# Patient Record
Sex: MALE | Race: WHITE
[De-identification: names, ages, dates, MRNs, and addresses within clinical notes are randomized per-mention and may not be internally consistent; named-entity substitution may affect disease eponyms.]

---

## 2023-04-11 ENCOUNTER — HOSPITAL ENCOUNTER (INPATIENT)
Dept: HOSPITAL 54 - ER | Age: 88
LOS: 5 days | Discharge: HOME | DRG: 280 | End: 2023-04-16
Attending: INTERNAL MEDICINE | Admitting: INTERNAL MEDICINE
Payer: MEDICARE

## 2023-04-11 VITALS — WEIGHT: 162 LBS | BODY MASS INDEX: 24.55 KG/M2 | HEIGHT: 68 IN

## 2023-04-11 VITALS — DIASTOLIC BLOOD PRESSURE: 93 MMHG | SYSTOLIC BLOOD PRESSURE: 145 MMHG

## 2023-04-11 VITALS — DIASTOLIC BLOOD PRESSURE: 83 MMHG | SYSTOLIC BLOOD PRESSURE: 142 MMHG

## 2023-04-11 VITALS — SYSTOLIC BLOOD PRESSURE: 145 MMHG | DIASTOLIC BLOOD PRESSURE: 93 MMHG

## 2023-04-11 VITALS — SYSTOLIC BLOOD PRESSURE: 200 MMHG | DIASTOLIC BLOOD PRESSURE: 120 MMHG

## 2023-04-11 DIAGNOSIS — D18.09: ICD-10-CM

## 2023-04-11 DIAGNOSIS — E83.39: ICD-10-CM

## 2023-04-11 DIAGNOSIS — I16.0: Primary | ICD-10-CM

## 2023-04-11 DIAGNOSIS — Z79.02: ICD-10-CM

## 2023-04-11 DIAGNOSIS — N17.0: ICD-10-CM

## 2023-04-11 DIAGNOSIS — I10: ICD-10-CM

## 2023-04-11 DIAGNOSIS — N28.1: ICD-10-CM

## 2023-04-11 DIAGNOSIS — K21.9: ICD-10-CM

## 2023-04-11 DIAGNOSIS — E78.5: ICD-10-CM
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
## 2023-04-11 DIAGNOSIS — Z90.79: ICD-10-CM

## 2023-04-11 DIAGNOSIS — E87.6: ICD-10-CM

## 2023-04-11 DIAGNOSIS — Z79.82: ICD-10-CM

## 2023-04-11 DIAGNOSIS — Z20.822: ICD-10-CM

## 2023-04-11 DIAGNOSIS — I21.A1: ICD-10-CM

## 2023-04-11 DIAGNOSIS — F33.2: ICD-10-CM

## 2023-04-11 DIAGNOSIS — T39.1X2A: ICD-10-CM

## 2023-04-11 DIAGNOSIS — I69.398: ICD-10-CM

## 2023-04-11 DIAGNOSIS — K71.2: ICD-10-CM

## 2023-04-11 DIAGNOSIS — Z79.899: ICD-10-CM

## 2023-04-11 DIAGNOSIS — R41.9: ICD-10-CM

## 2023-04-11 DIAGNOSIS — D69.6: ICD-10-CM

## 2023-04-11 DIAGNOSIS — D68.9: ICD-10-CM

## 2023-04-11 DIAGNOSIS — F05: ICD-10-CM

## 2023-04-11 LAB
ALBUMIN SERPL BCP-MCNC: 4.4 G/DL (ref 3.4–5)
ALP SERPL-CCNC: 59 U/L (ref 46–116)
ALT SERPL W P-5'-P-CCNC: 682 U/L (ref 12–78)
AST SERPL W P-5'-P-CCNC: 415 U/L (ref 15–37)
BASOPHILS # BLD AUTO: 0.1 K/UL (ref 0–0.2)
BASOPHILS NFR BLD AUTO: 0.6 % (ref 0–2)
BILIRUB DIRECT SERPL-MCNC: 0.4 MG/DL (ref 0–0.2)
BILIRUB SERPL-MCNC: 0.8 MG/DL (ref 0.2–1)
BILIRUB UR QL STRIP: NEGATIVE
BUN SERPL-MCNC: 38 MG/DL (ref 7–18)
CALCIUM SERPL-MCNC: 9.3 MG/DL (ref 8.5–10.1)
CHLORIDE SERPL-SCNC: 100 MMOL/L (ref 98–107)
CO2 SERPL-SCNC: 24 MMOL/L (ref 21–32)
COLOR UR: YELLOW
CREAT SERPL-MCNC: 2.1 MG/DL (ref 0.6–1.3)
EOSINOPHIL NFR BLD AUTO: 0 % (ref 0–6)
GLUCOSE SERPL-MCNC: 113 MG/DL (ref 74–106)
GLUCOSE UR STRIP-MCNC: NEGATIVE MG/DL
HCT VFR BLD AUTO: 50 % (ref 39–51)
HGB BLD-MCNC: 16.8 G/DL (ref 13.5–17.5)
LEUKOCYTE ESTERASE UR QL STRIP: NEGATIVE
LYMPHOCYTES NFR BLD AUTO: 0.8 K/UL (ref 0.8–4.8)
LYMPHOCYTES NFR BLD AUTO: 7.9 % (ref 20–44)
MCHC RBC AUTO-ENTMCNC: 34 G/DL (ref 31–36)
MCV RBC AUTO: 97 FL (ref 80–96)
MONOCYTES NFR BLD AUTO: 0.7 K/UL (ref 0.1–1.3)
MONOCYTES NFR BLD AUTO: 7.2 % (ref 2–12)
NEUTROPHILS # BLD AUTO: 8.2 K/UL (ref 1.8–8.9)
NEUTROPHILS NFR BLD AUTO: 84.3 % (ref 43–81)
NITRITE UR QL STRIP: NEGATIVE
PH UR STRIP: 5 [PH] (ref 5–8)
PLATELET # BLD AUTO: 107 K/UL (ref 150–450)
POTASSIUM SERPL-SCNC: 4.2 MMOL/L (ref 3.5–5.1)
PROT SERPL-MCNC: 8.1 G/DL (ref 6.4–8.2)
PROT UR QL STRIP: 100 MG/DL
RBC # BLD AUTO: 5.19 MIL/UL (ref 4.5–6)
RBC #/AREA URNS HPF: (no result) /HPF (ref 0–2)
SODIUM SERPL-SCNC: 138 MMOL/L (ref 136–145)
UROBILINOGEN UR STRIP-MCNC: 0.2 EU/DL
WBC #/AREA URNS HPF: (no result) /HPF (ref 0–3)
WBC NRBC COR # BLD AUTO: 9.8 K/UL (ref 4.3–11)

## 2023-04-11 PROCEDURE — G0378 HOSPITAL OBSERVATION PER HR: HCPCS

## 2023-04-11 PROCEDURE — A4223 INFUSION SUPPLIES W/O PUMP: HCPCS

## 2023-04-11 PROCEDURE — C9803 HOPD COVID-19 SPEC COLLECT: HCPCS

## 2023-04-11 RX ADMIN — SODIUM CHLORIDE PRN MLS/HR: 4.5 INJECTION, SOLUTION INTRAVENOUS at 17:03

## 2023-04-11 RX ADMIN — HYDRALAZINE HYDROCHLORIDE PRN MG: 20 INJECTION INTRAMUSCULAR; INTRAVENOUS at 15:29

## 2023-04-11 NOTE — NUR
DARWIN 89 FROM HOME FOR GENERALIZED WEAKNE FOR THE PAST COUPLE MONTHS WITH 
INCREASED WEAKNESS THE PAST COUPLE DAYS. PT HAS A STROKE A COUPLE MONTHS AGO. 
PT ATTACHED TO MONITOR, BLOOD PRESSURE ELEVATED, MD AWARE. AWAITING MD ORDERS.

## 2023-04-11 NOTE — NUR
TELE RN OPENING NOTES - RECEIVED PATIENT AWAKE IN BED. A/O X2, FARSI SPEAKING. BREATHING 
EVEN AND NON-LABORED ON ROOM AIR. NOT IN APPARENT DISTRESS. DENIES PAIN AT THIS TIME. ON 
TELE MONITOR READING SINUS TACHYCARDIA  BPM. HAS LEFT ANTECUBITAL IV ACCESS #18G WITH 
0.45% NS RUNNING AT 75 ML/HR. NO S/S OF INFILTRATION NOTED. SAFETY PRECAUTIONS IN PLACE: BED 
LOCKED AND IN LOW POSITION, SIDE RAILS UP X2, CALL LIGHT WITHIN REACH. WILL CONTINUE PLAN OF 
CARE.

## 2023-04-11 NOTE — NUR
RN TELE NOTES

REPORTED LATEST TROPONIN LEVEL  TO DR. LIN, NO NEW ORDER GIVEN, ALSO RECEIVED 
ORDER FOR CODE STATUS DNR/DNI, NOTED AND CARRIED OUT.

## 2023-04-11 NOTE — NUR
RECEIVED CALL FROM LAB, CRITICAL VALUE TROPONIN 136, TRENDING DOWN. HOSPITALIST ALINE HEBERT.

-------------------------------------------------------------------------------

Addendum: 04/11/23 at 2216 by MARIO MAY DINESH DUMONT

-------------------------------------------------------------------------------

JOSE

## 2023-04-11 NOTE — NUR
RN TELE ADMISSION NOTES



ADMITTED PT AROUND [id number], FARSI SPEAKING. WIFE AT BEDSIDE TO TRANSLATE. PRE ASSESSMENT 
COMPLETED, SKIN INTACT. IV ACCESS ON LAC 22 G SL. ANXIOUS AT TIMES. BLOOD PRESSURE HIGH, MD 
INFORMED. PT APPEARS FLUSHED. OXYGEN AT 98%. WILL CONTINUE TO MONITOR. ON PUREED DIET. 
SAFETY MEASURES IN PLACED. BED LOCKED IN LOWEST POSITION. SIDE RAILS UP X2. CALL LIGHT 
WITHIN REACH.

## 2023-04-11 NOTE — NUR
RN TELE NOTES

/120 HR 97, LATEST TROPONIN 99, DR. LIN INFORMED, ORDERED FOR HYDRALAZINE IV, 
NOTED AND CARRIED OUT, PT IS SINUS TACH ON TELE MONITOR.

## 2023-04-11 NOTE — NUR
RN TELE CLOSING NOTES



PT RESTING IN BED, AO X2-3, FARSI SPEAKING, CONFUSED AT TIMES. DNR/DNI STATUS. ADMITTED D/T 
NSTEMI AND AMS. IV ACCESS ON LAC #22G RUNNING 0.45% NS RUNNING AT 75 ML/HR. ON PUREED FIET, 
MEDS CRUSHED. CONTINENT USING URINAL. ON TELE MONITOR AT SR 99. NO COMPLAINTS AT THIS TIME. 
NO SIGNS/SYMPTOMS OF RESPIRATORY DISTRESS. SKIN INTACT. SAFETY PRECAUTIONS IN PLACED. SIDE 
RAILS UP X2, BED LOCKED IN LOWEST POSITION, CALL LIGHT WITHIN REACH. ALL MEDS ADMINISTERED 
AS SCHEDULED. WILL ENDORSE TO NEXT SHIFT.

## 2023-04-12 VITALS — SYSTOLIC BLOOD PRESSURE: 147 MMHG | DIASTOLIC BLOOD PRESSURE: 80 MMHG

## 2023-04-12 VITALS — DIASTOLIC BLOOD PRESSURE: 70 MMHG | SYSTOLIC BLOOD PRESSURE: 140 MMHG

## 2023-04-12 VITALS — DIASTOLIC BLOOD PRESSURE: 83 MMHG | SYSTOLIC BLOOD PRESSURE: 140 MMHG

## 2023-04-12 VITALS — SYSTOLIC BLOOD PRESSURE: 133 MMHG | DIASTOLIC BLOOD PRESSURE: 86 MMHG

## 2023-04-12 VITALS — SYSTOLIC BLOOD PRESSURE: 158 MMHG | DIASTOLIC BLOOD PRESSURE: 86 MMHG

## 2023-04-12 VITALS — DIASTOLIC BLOOD PRESSURE: 86 MMHG | SYSTOLIC BLOOD PRESSURE: 148 MMHG

## 2023-04-12 LAB
ALBUMIN SERPL BCP-MCNC: 3.7 G/DL (ref 3.4–5)
ALP SERPL-CCNC: 48 U/L (ref 46–116)
ALT SERPL W P-5'-P-CCNC: 4013 U/L (ref 12–78)
AMMONIA PLAS-SCNC: 37 UMOL/L (ref 11–32)
APAP SERPL-MCNC: 91 UG/ML (ref 10–30)
AST SERPL W P-5'-P-CCNC: 2873 U/L (ref 15–37)
BASOPHILS # BLD AUTO: 0 K/UL (ref 0–0.2)
BASOPHILS NFR BLD AUTO: 0 % (ref 0–2)
BILIRUB DIRECT SERPL-MCNC: 0.7 MG/DL (ref 0–0.2)
BILIRUB SERPL-MCNC: 1.1 MG/DL (ref 0.2–1)
BUN SERPL-MCNC: 46 MG/DL (ref 7–18)
CALCIUM SERPL-MCNC: 8.4 MG/DL (ref 8.5–10.1)
CHLORIDE SERPL-SCNC: 103 MMOL/L (ref 98–107)
CHOLEST SERPL-MCNC: 151 MG/DL (ref ?–200)
CO2 SERPL-SCNC: 16 MMOL/L (ref 21–32)
CREAT SERPL-MCNC: 2.1 MG/DL (ref 0.6–1.3)
EOSINOPHIL NFR BLD AUTO: 0 % (ref 0–6)
GLUCOSE SERPL-MCNC: 92 MG/DL (ref 74–106)
HCT VFR BLD AUTO: 48 % (ref 39–51)
HDLC SERPL-MCNC: 81 MG/DL (ref 40–60)
HGB BLD-MCNC: 16 G/DL (ref 13.5–17.5)
LDLC SERPL DIRECT ASSAY-MCNC: 70 MG/DL (ref 0–99)
LYMPHOCYTES NFR BLD AUTO: 0.6 K/UL (ref 0.8–4.8)
LYMPHOCYTES NFR BLD AUTO: 4.8 % (ref 20–44)
MAGNESIUM SERPL-MCNC: 2.4 MG/DL (ref 1.8–2.4)
MCHC RBC AUTO-ENTMCNC: 34 G/DL (ref 31–36)
MCV RBC AUTO: 97 FL (ref 80–96)
MONOCYTES NFR BLD AUTO: 0.6 K/UL (ref 0.1–1.3)
MONOCYTES NFR BLD AUTO: 4.5 % (ref 2–12)
NEUTROPHILS # BLD AUTO: 11.2 K/UL (ref 1.8–8.9)
NEUTROPHILS NFR BLD AUTO: 90.7 % (ref 43–81)
PHOSPHATE SERPL-MCNC: 5.8 MG/DL (ref 2.5–4.9)
PLATELET # BLD AUTO: 103 K/UL (ref 150–450)
POTASSIUM SERPL-SCNC: 4.2 MMOL/L (ref 3.5–5.1)
PROT SERPL-MCNC: 6.5 G/DL (ref 6.4–8.2)
RBC # BLD AUTO: 4.9 MIL/UL (ref 4.5–6)
SODIUM SERPL-SCNC: 137 MMOL/L (ref 136–145)
TRIGL SERPL-MCNC: 35 MG/DL (ref 30–150)
TSH SERPL DL<=0.005 MIU/L-ACNC: 0.36 UIU/ML (ref 0.36–3.74)
WBC NRBC COR # BLD AUTO: 12.4 K/UL (ref 4.3–11)

## 2023-04-12 RX ADMIN — Medication SCH TAB: at 08:25

## 2023-04-12 RX ADMIN — PANTOPRAZOLE SODIUM SCH MG: 40 TABLET, DELAYED RELEASE ORAL at 07:47

## 2023-04-12 RX ADMIN — SODIUM CHLORIDE PRN MLS/HR: 4.5 INJECTION, SOLUTION INTRAVENOUS at 08:02

## 2023-04-12 RX ADMIN — SODIUM CHLORIDE PRN MLS/HR: 4.5 INJECTION, SOLUTION INTRAVENOUS at 21:46

## 2023-04-12 RX ADMIN — Medication SCH TAB: at 17:07

## 2023-04-12 RX ADMIN — CLOPIDOGREL BISULFATE SCH MG: 75 TABLET, FILM COATED ORAL at 08:25

## 2023-04-12 RX ADMIN — OXYCODONE HYDROCHLORIDE AND ACETAMINOPHEN SCH MG: 500 TABLET ORAL at 08:25

## 2023-04-12 NOTE — NUR
RN NOTES - WIFE IS AT BEDSIDE ASKING FOR SANDWICH AND WANTS THE PATIENT TO WALK. EXPLAINED 
THAT THE PATIENT IS AN ASPIRATION AND FALL RISK FOR NOW. PATIENT IS COMPLIANT.

## 2023-04-12 NOTE — NUR
TELE RN OPENING NOTES 



RECEIVED PATIENT AWAKE IN BED WITH HOB ELEVATED, A/OX2, APPEARS CONFUSED, FARSI SPEAKING, 
CLEAR SPEECH, ABLE TO MAKE NEEDS KNOWN. ON ROOM AIR BREATHING EVEN AND NOT IN ANY APPARENT 
ACUTE DISTRESS. DENIES PAIN OR DISCOMFORT AT THIS TIME. TELEMONITOR IS SHOWING SINUS RHYTHM 
AT 99 BPM/ 107 BPM. IV ACCESS ON LEFT ANTECUBITAL #18G WITH 1/2 NS RUNNING AT 75 ML/HR, 
PATENT AND FLUSHING WELL. SAFETY PRECAUTIONS IN PLACE: BED LOCKED AND IN LOWEST POSITION, 
SIDE RAILS UP X2, BED ALARM ON, CALL LIGHT AND TRAY WITHIN REACH. WILL CONTINUE PLAN OF 
CARE.

## 2023-04-12 NOTE — NUR
TELE RN CLOSING NOTES - PATIENT RESTING, ABLE TO RELAY NEEDS. NO SOB OR  NOTED, 
SATURATING WELL ON ROOM AIR. NO RESPIRATORY OR CARDIAC DISTRESS NOTED. AFEBRILE. ON TELE 
MONITOR READING SINUS TACHYCARDIA  BPM. LEFT ANTECUBITAL IV ACCESS INTACT, PATENT AND 
FLUSHING. ASPIRATION PRECAUTIONS OBSERVED. ABLE TO USE URINAL BOTTLE, CLEAR YELLOW URINE 
NOTED. ALL DUE MEDS GIVEN AND NEEDS ATTENDED. SAFETY PRECAUTIONS MAINTAINED. WILL ENDORSE TO 
NEXT SHIFT FOR CHRISTINE.

## 2023-04-12 NOTE — NUR
TELE RN CLOSING NOTES 



PATIENT AWAKE IN BED WITH HOB ELEVATED, A/OX2, EPISODES OF CONFUSION, COOPERATIVE. STILL ON 
ROOM AIR BREATHING EVEN, NO SOB. NO PAIN OR DISCOMFORT AT THIS TIME. TELEMONITOR IS SHOWING 
SINUS RHYTHM AT 93 BPM WITH BORDERLINE 1ST DEGREE BLOCK. WITH IV ACCESS ON LEFT ANTECUBITAL 
#18G WITH 1/2 NS RUNNING AT 75 ML/HR, PATENT AND FLUSHING WELL. ALL DUE MEDS GIVEN, ALL 
NEEDS MET. SAFETY PRECAUTIONS MAINTAINED: BED LOCKED AND IN LOWEST POSITION, SIDE RAILS UP 
X2, BED ALARM ON, CALL LIGHT AND TRAY WITHIN REACH. ENDORSED TO NIGHT SHIFT NURSE.

## 2023-04-12 NOTE — NUR
RN NOTES - PT COMPLAINING OF HEADACHE 7/10 AND SHORTNESS OF BREATH, WHEN CHECKED VITAL SIGNS 
ARE STABLE. SPO2 - 97% ON ROOM AIR. GIVEN NORCO 10/325 VIA PO. MD GAVE AN ORDER FOR OXYGEN 
INHALATION FOR COMFORT, WILL GIVE 1LPM VIA NC AND CHEST XRAY ORDERED AS WELL.

## 2023-04-13 VITALS — DIASTOLIC BLOOD PRESSURE: 83 MMHG | SYSTOLIC BLOOD PRESSURE: 165 MMHG

## 2023-04-13 VITALS — SYSTOLIC BLOOD PRESSURE: 183 MMHG | DIASTOLIC BLOOD PRESSURE: 82 MMHG

## 2023-04-13 VITALS — DIASTOLIC BLOOD PRESSURE: 69 MMHG | SYSTOLIC BLOOD PRESSURE: 147 MMHG

## 2023-04-13 VITALS — SYSTOLIC BLOOD PRESSURE: 150 MMHG | DIASTOLIC BLOOD PRESSURE: 87 MMHG

## 2023-04-13 VITALS — SYSTOLIC BLOOD PRESSURE: 152 MMHG | DIASTOLIC BLOOD PRESSURE: 83 MMHG

## 2023-04-13 VITALS — DIASTOLIC BLOOD PRESSURE: 95 MMHG | SYSTOLIC BLOOD PRESSURE: 148 MMHG

## 2023-04-13 VITALS — DIASTOLIC BLOOD PRESSURE: 63 MMHG | SYSTOLIC BLOOD PRESSURE: 159 MMHG

## 2023-04-13 LAB
ALBUMIN SERPL BCP-MCNC: 3.3 G/DL (ref 3.4–5)
ALP SERPL-CCNC: 55 U/L (ref 46–116)
ALT SERPL W P-5'-P-CCNC: (no result) U/L (ref 12–78)
AST SERPL W P-5'-P-CCNC: 6374 U/L (ref 15–37)
BASOPHILS # BLD AUTO: 0 K/UL (ref 0–0.2)
BASOPHILS NFR BLD AUTO: 0.1 % (ref 0–2)
BILIRUB DIRECT SERPL-MCNC: 0.6 MG/DL (ref 0–0.2)
BILIRUB SERPL-MCNC: 1.2 MG/DL (ref 0.2–1)
BUN SERPL-MCNC: 48 MG/DL (ref 7–18)
CALCIUM SERPL-MCNC: 8.1 MG/DL (ref 8.5–10.1)
CHLORIDE SERPL-SCNC: 103 MMOL/L (ref 98–107)
CO2 SERPL-SCNC: 20 MMOL/L (ref 21–32)
CREAT SERPL-MCNC: 1.8 MG/DL (ref 0.6–1.3)
EOSINOPHIL NFR BLD AUTO: 0 % (ref 0–6)
GLUCOSE SERPL-MCNC: 86 MG/DL (ref 74–106)
HCT VFR BLD AUTO: 47 % (ref 39–51)
HGB BLD-MCNC: 15.3 G/DL (ref 13.5–17.5)
LYMPHOCYTES NFR BLD AUTO: 0.6 K/UL (ref 0.8–4.8)
LYMPHOCYTES NFR BLD AUTO: 6.5 % (ref 20–44)
LYMPHOCYTES NFR BLD MANUAL: 4 % (ref 16–48)
MAGNESIUM SERPL-MCNC: 2.4 MG/DL (ref 1.8–2.4)
MCHC RBC AUTO-ENTMCNC: 33 G/DL (ref 31–36)
MCV RBC AUTO: 99 FL (ref 80–96)
MONOCYTES NFR BLD AUTO: 0.2 K/UL (ref 0.1–1.3)
MONOCYTES NFR BLD AUTO: 2.2 % (ref 2–12)
MONOCYTES NFR BLD MANUAL: 2 % (ref 0–11)
NEUTROPHILS # BLD AUTO: 8.2 K/UL (ref 1.8–8.9)
NEUTROPHILS NFR BLD AUTO: 91.2 % (ref 43–81)
NEUTS SEG NFR BLD MANUAL: 94 % (ref 42–76)
PHOSPHATE SERPL-MCNC: 3.4 MG/DL (ref 2.5–4.9)
PLATELET # BLD AUTO: 67 K/UL (ref 150–450)
POTASSIUM SERPL-SCNC: 3.7 MMOL/L (ref 3.5–5.1)
PROT SERPL-MCNC: 5.8 G/DL (ref 6.4–8.2)
RBC # BLD AUTO: 4.72 MIL/UL (ref 4.5–6)
SODIUM SERPL-SCNC: 135 MMOL/L (ref 136–145)
WBC NRBC COR # BLD AUTO: 9 K/UL (ref 4.3–11)

## 2023-04-13 RX ADMIN — SODIUM CHLORIDE PRN MLS/HR: 4.5 INJECTION, SOLUTION INTRAVENOUS at 12:02

## 2023-04-13 RX ADMIN — HYDRALAZINE HYDROCHLORIDE PRN MG: 20 INJECTION INTRAMUSCULAR; INTRAVENOUS at 08:06

## 2023-04-13 RX ADMIN — CLOPIDOGREL BISULFATE SCH MG: 75 TABLET, FILM COATED ORAL at 08:05

## 2023-04-13 RX ADMIN — Medication SCH TAB: at 08:06

## 2023-04-13 RX ADMIN — OXYCODONE HYDROCHLORIDE AND ACETAMINOPHEN SCH MG: 500 TABLET ORAL at 08:06

## 2023-04-13 RX ADMIN — HYDRALAZINE HYDROCHLORIDE PRN MG: 20 INJECTION INTRAMUSCULAR; INTRAVENOUS at 16:09

## 2023-04-13 RX ADMIN — Medication SCH UNIT: at 08:12

## 2023-04-13 RX ADMIN — Medication SCH TAB: at 16:08

## 2023-04-13 RX ADMIN — PANTOPRAZOLE SODIUM SCH MG: 40 TABLET, DELAYED RELEASE ORAL at 07:45

## 2023-04-13 RX ADMIN — HYDRALAZINE HYDROCHLORIDE PRN MG: 20 INJECTION INTRAMUSCULAR; INTRAVENOUS at 20:56

## 2023-04-13 NOTE — NUR
TELE RN OPENING NOTES 



RECEIVED PATIENT AWAKE SITTING ON THE EDGE OF THE BED, A/OX2, FARSI SPEAKING, CALM, SLOWED 
SPEECH, ABLE TO MAKE NEEDS KNOWN. ON ROOM AIR BREATHING EVEN AND NOT IN ANY APPARENT ACUTE 
DISTRESS. DENIES PAIN OR DISCOMFORT AT THIS TIME. TELEMONITOR IS SHOWING SINUS RHYTHM AT 86 
BPM/ 107 BPM. IV ACCESS ON LEFT ANTECUBITAL #18G WITH 1/2 NS RUNNING AT 75 ML/HR, PATENT AND 
FLUSHING WELL. AFEBRILE. SAFETY PRECAUTIONS IN PLACE: BED LOCKED AND IN LOWEST POSITION, 
SIDE RAILS UP X2, BED ALARM ON, CALL LIGHT AND TRAY WITHIN REACH. WILL CONTINUE PLAN OF 
CARE.

## 2023-04-13 NOTE — NUR
RN NOTES - DR CROWELL AT BEDSIDE WITH TIM TENA, PATIENT DENIED ATTEMPTING 
SUICIDE BUT WASN'T ANSWERING FOLLOW UP QUESTIONS DIRECTLY. DR CROWELL GAINED PERMISSION FROM 
PT TO EXPLAIN TO THE WIFE. DR CROWELL SPOKE WITH THE WIFE AS WELL.

## 2023-04-13 NOTE — NUR
RN NOTES - ORDERED CM AND SW CONSULTATION AND SPOKE TO DERREK MEDEL AND RAJINDER GODINEZ, THEY WILL SEE 
THE PATIENT.

## 2023-04-13 NOTE — NUR
TELE RN NOTE



PATIENT WAS NOTED WITH ELEVATED /74, P-101. ADMINISTERED PRN HYDRALAZINE FOR SBP >150 
AS ORDERED.

## 2023-04-13 NOTE — NUR
RN NOTES - HYDRALAZINE IV 10 MG .5 ML GIVEN AS ORDERED FOR BP /82 MMHG DE = 94. WILL 
CONTINUE TO MONITOR.

## 2023-04-13 NOTE — NUR
closing notes: alert and orientated x3

farsi speaking but able to make jis needs known

ambulated to the bathroom with 1 nurse at his side slow steady shuffle gait  good bm soft 
and formed

back to bed drinking water with assist bed alarm on

ST on the cardiac monitor 92 no SOB with ambulation

## 2023-04-13 NOTE — NUR
RN NOTES - ACETADOTE IV 22,000 MG IN D5W 1000 ML WAS RECEIVED AT AROUND 1245, WITNESSED BY 
CHARGE NURSE. HANGED THE LINE AT 1257 TO RUN FOR 20 HOURS, ADJUSTED LIVER FUNCTION TEST AND 
PT INR TO BE DONE AT 0800 (19TH HOUR) TOMORROW 4/14. INFORMED PHARMACIST ELZBIETA, ACKNOWLEDGED.

## 2023-04-13 NOTE — NUR
SS NOTE: 

SS consult requested for alleged intentional OD. Per EMR,the pt. is an 88 year old Farsi 
Speaking gentlemen who was admitted to [facility] due to "hypertensive urgency". Per EMR, pt. 
has history of stroke about 4 months ago. He told the nurse that he intentionally took about 
30 pills as a suicide attempt. Per nurse, pt. denies current SI and psych consult has been 
requested for this pt. Pt. also told nurse that he does not want his wife to be notified of 
the intentional OD. RAJINDER met with pt. at bedside along with pt.'s nurse, Inocente  and 
Alana CLEMENTS. Pt. is now denying ever admitting to intentional OD as a suicide attempt and 
stating he took those pills due to back pain. Pt. denies current Suicidal ideation.  The 
pt.'s wife, Dutch Conklin tel: [phone] was not int he room at time of interview per his 
request but is in the hospital visiting him. The pt. currently resides [address] home [[address] [place] [address]] with wife. 



DC PLAN: RAJINDER offered pt.. voluntary admission to [facility] hospital once medically cleared and 
pt. refused. Pt. states he would want to return home [[address] [place] [address]] with wife when medically cleared. RAJINDER provided pt. with the following mental health 
referrals and pt. accepted them: 



Mental Health /Counseling Services

Tiffany Najera

 [address] [place] [address] [phone]

Services: Outpatient therapy for children, teens, young adults, adults, older adults, and 
families; Psychiatric services, medication support



[facility] (Behavioral Health)

[address] [place] [address] [phone]

Operation Hours: MON - FRI 8:00 a.m. - 5:00 p.m. Walk In Hours: MON - FRI 8:00 a.m. - 5:00 
p.m.

Mental Health Services: [facility] ([facility])

(Medication Support,  Mental Health Services, Peer Support

 NOTE: [facility] 24/7 helpline: [phone]



[address], [address]

[place] [address]

[phone]

(Specializes in in-depth psychotherapy for emotional distress: anxiety, depression, 
interpersonal conflicts, life transitions, childhood abuse)



[facility] (Behavioral Health)

[address], 2nd floor Need appointment

[place] [address]

Main Number: [phone] [facility] ([facility]): Contact [phone]





[facility]

[address] [place] [address] [phone]

(Assist with solving problem marital difficulties, separation & divorce, aging parents, 
death & grief, chronic & terminal illness)



[facility]

[address] [place] [address] [phone]

(Deal with loss & grief, anxiety, marital difficulties) 



Homebound/Mental Health Services

[address]

[place] [address] [phone]

(Provide in-home mental services to people who are incapable of leaving their homes)



[facility]

[facility]

[address]

[place] [address] [phone]



[facility] 

[address]

[place], [place] [id number] [phone]



PSYCHIATRIC OUTPATIENT SERVICES



[facility] Partial Hospitalization and Intensive Outpatient Program 

(Managed Care and [place] Only)

[address] [facility] [address]; [phone]



[facility] Partial Hospitalization and Outpatient Program

[address]  [address] [place] [address]; [phone]



[facility] [place] [address]

[place], [place] [phone]

[facility] Partial Hospitalization and Outpatient 
[address] [place], [place] ;   [phone]    [phone]



ADOLESCENT AND CHILDRENS PSYCHIATRIC TREATMENT

[facility]

Crisis stabilization, medication support mental health services

[address] [place] [address] [phone] Appointment needed



[facility]

[address] [place] [address] [phone]









[place] Crisis and Hotline Telephone Numbers:

24-Hour service unless stated 

[place] Mental Health/Crisis Line........[phone]

Suicide Prevention Center (24 Hours).......[phone]

Suicide Prevention Crisis Center.......[phone] (24 Hours)

Alcoholics Anonymous (24 Hours)..........[phone]

National Crisis Hotlines:

Alcohol and Drug Helpline - Provides referrals to local facilities where adolescents and 
adults can seek help. Brief intervention. 1-271.192.4970

[facility] Helpline National [place] for the Mentally Ill 8-551-262-LWSR

National Youth Crisis Hotline 1-435.412.8947

[facility] Provides free information on specific disorders, referral 
directory to mental health providers, national directory of local mental health associations 
1-979.841.7592 (M-F, 9-5 EST)

National [place] of Mental Health Information Line: Provides information and literature on 
mental illness by disorder-for professionals and general public. 1-748.271.1692



[facility] Substance Abuse Self-helpline ([facility]) 

Contact number [phone]. 

Call the hotline and the  will screen and link individual to an appropriate program. 
Must have Medi-nyasia or be Medi-nyasia eligible.

## 2023-04-13 NOTE — NUR
RN NOTES - GRANTING THE PATIENT'S REQUEST EARLIER TO NOT LET HIS WIFE KNOW ABOUT THE 
OVERDOSE, I WAS ASKED BY MD TO CHECK AGAIN WITH THE PATIENT IF HE WANTS TO TALK ABOUT IT 
WITH THE MD AND WIFE IN THE ROOM. WIFE SAW US ENTERING THE ROOM WITH TIM GARCIA TRANSLATING 
FOR ME IN [facility]. I ASKED THE WIFE TO LEAVE THE ROOM FIRST BUT WIFE INSISTED SHE STAYED WITH 
THE PATIENT. PATIENT WAS ASKED IF HE WANTED US TO DISCUSS WHAT THE TOLD US THIS MORNING WITH 
THE MD BUT HE WASN'T ANSWERING THE QUESTION DIRECTLY. WIFE DID SAY IN [facility] TO LET US KNOW 
THAT HE TOOK TYLENOL FOR BACK PAIN. THAT'S WHAT THE PATIENT RESPONDED - THAT HE TOOK TYLENOL 
FOR BACK PAIN.

## 2023-04-13 NOTE — NUR
RN NOTES - PATIENT DENIED THAT HE TOOK TYLENOL AND HE ATTEMPTED TO KILL HIMSELF WHILE MD MARY, RAJINDER GODINEZ WITH TIM GARCIA IN THE ROOM TRANSLATING FARSI FOR US. WIFE IS NOT AT 
BEDSIDE. PATIENT MENTIONED THAT HE TOOK TYLENOL FOR HIS BACK PAIN. PATIENT DENIED SUICIDAL 
IDEATION AND HAS NO ACTIVE PLAN OF HURTING HIMSELF. IDT AWARE AND WITNESSED.

## 2023-04-13 NOTE — NUR
RN NOTES - PATIENT INFORMED ME WITH THE HELP OF FARSI SPEAKING RN THAT HE DID TAKE 30 
TABLETS OF TYLENOL TO KILL HIMSELF AND HE DIDNT WANT THE WIFE TO KNOW. PATIENT DOESNT HAVE 
PLANS OF COMMITTING SUICIDE AGAIN. INVOLVED CHARGE NURSE KELLIE AS WELL. MD LIN HAS BEEN 
MADE AWARE ORDERED 1 ON 1 SITTER. CALLED [employer] FOR STAT PSYCH EVAL FOR SUICIDE ATTEMPT, 
CONFIRMED WILL BE SEEN BY DR CROWELL.

## 2023-04-13 NOTE — NUR
TELE RN CLOSING NOTES 



PATIENT AWAKE LYING IN BED WITH HOB ELEVATED, A/OX2, ABLE TO MAKE NEEDS KNOWN. STILL ON ROOM 
AIR BREATHING EVEN AND NOT IN ANY APPARENT ACUTE DISTRESS. NO COMPLAINTS OF PAIN OR 
DISCOMFORT AT THIS TIME. TELEMONITOR IS SHOWING SINUS RHYTHM AT 98 BPM. IV ACCESS ON LEFT 
WRIST #20G, PATENT, FLUSHING WELL. ACETADOTE IV 22,000 MG IN D5W 1000 ML @55.55 ML/HR FOR 20 
HOURS. IV TO END AT 0900 TOMORROW. RELAYED TO THE NIGHT NURSE TO ENDORSE THE ONCOMING NURSE 
OF THE SCHEDULED LIVER FX TEST AND PT INR ON THE 19TH HOUR (0800). WILL NEED 2 DOWNTREND OF 
ALT AND AST FOR THE N ACETYL CYSTEINE TO BE DC'D, CHARGE NURSE IS AWARE. PLEASE COORDINATE 
WITH PHARMACIST. ADVISED TO HOLD PLAVIX AS WELL. SUICIDE, ASPIRATION AND FALL PRECAUTIONS 
MAINTAINED. KEPT PATIENT SAFE, ALL DUE MEDS GIVEN. ALL NEEDS MET. BED LOCKED AND IN LOWEST 
POSITION, SIDE RAILS UP X2, BED ALARM ON, CALL LIGHT AND TRAY WITHIN REACH. ENDORSED TO THE 
NIGHT SHIFT NURSE.

## 2023-04-13 NOTE — NUR
TELE RN OPENING NOTE



RECEIVED PATIENT IN BED, AWAKE, ALERT AND ORIENTED X2, FARSI SPEAKING. ABLE TO MAKE NEEDS 
KNOWN. AFEBRILE AND NOT IN ANY FORM OF ACUTE DISTRESS. BREATHING EVEN AND NON LABORED. 
DENIES THOUGHTS OF HURTING HIMSELF. ON TELE MONITORING WITH CURRENT READING OF SR  98 WITH 
1ST DEGREE AV BLOCK. WITH IV ACCESS ON L WRIST 20G RUNNING WITH ACETADONE IN D5W REGULATED 
AT 55.55ML/HR. SAFETY MEASURES IN PLACE. KEPT BED IN LOCKED AND IN LOW POSITION. SIDE RAILS 
UP X2. ADVISED TO USE THE CALL LIGHT WHEN IN NEED OF ASSISTANCE.

## 2023-04-13 NOTE — NUR
RN NOTES - CALLED POISON CONTROL [phone] AT 0949 AND REPORTED THE ACETAMINOPHEN 
TOXICITY - GAVE ANTIDOTE ORDERS AS FOLLOWS:



N-ACETYL CYSTEINE IV - 150 MG/KG  ML IN D5W OVER 1 HOUR AND AFTER 300 MG/KG IN 1L D5W 
OVER 20 HOURS TO ORDER ALT AND AST BEFORE THE LAST HOUR. IF STILL ELEVATED TO REPEAT 300 
MG/KG IN 1L D5W OVER 20 HOURS. 



CALLED PHARMACY TO RELAY THE ORDER AND ASKED PHARMACIST TO VERIFY THE ORDER. GAVE REFERENCE 
# [phone]



ORDERED PT INR AS WELL PER POISON CONTROL PROTOCOL.

## 2023-04-14 VITALS — DIASTOLIC BLOOD PRESSURE: 97 MMHG | SYSTOLIC BLOOD PRESSURE: 151 MMHG

## 2023-04-14 VITALS — SYSTOLIC BLOOD PRESSURE: 135 MMHG | DIASTOLIC BLOOD PRESSURE: 76 MMHG

## 2023-04-14 VITALS — SYSTOLIC BLOOD PRESSURE: 164 MMHG | DIASTOLIC BLOOD PRESSURE: 94 MMHG

## 2023-04-14 VITALS — DIASTOLIC BLOOD PRESSURE: 82 MMHG | SYSTOLIC BLOOD PRESSURE: 150 MMHG

## 2023-04-14 VITALS — SYSTOLIC BLOOD PRESSURE: 166 MMHG | DIASTOLIC BLOOD PRESSURE: 99 MMHG

## 2023-04-14 VITALS — SYSTOLIC BLOOD PRESSURE: 164 MMHG | DIASTOLIC BLOOD PRESSURE: 77 MMHG

## 2023-04-14 LAB
ALBUMIN SERPL BCP-MCNC: 2.7 G/DL (ref 3.4–5)
ALP SERPL-CCNC: 55 U/L (ref 46–116)
BASOPHILS # BLD AUTO: 0 K/UL (ref 0–0.2)
BASOPHILS NFR BLD AUTO: 0.1 % (ref 0–2)
BILIRUB DIRECT SERPL-MCNC: 1.1 MG/DL (ref 0–0.2)
BILIRUB SERPL-MCNC: 2.9 MG/DL (ref 0.2–1)
BUN SERPL-MCNC: 40 MG/DL (ref 7–18)
CALCIUM SERPL-MCNC: 8.8 MG/DL (ref 8.5–10.1)
CHLORIDE SERPL-SCNC: 96 MMOL/L (ref 98–107)
CO2 SERPL-SCNC: 22 MMOL/L (ref 21–32)
CREAT SERPL-MCNC: 1.6 MG/DL (ref 0.6–1.3)
EOSINOPHIL NFR BLD AUTO: 0 % (ref 0–6)
GLUCOSE SERPL-MCNC: 128 MG/DL (ref 74–106)
HCT VFR BLD AUTO: 50 % (ref 39–51)
HGB BLD-MCNC: 16.9 G/DL (ref 13.5–17.5)
LYMPHOCYTES NFR BLD AUTO: 0.6 K/UL (ref 0.8–4.8)
LYMPHOCYTES NFR BLD AUTO: 5.5 % (ref 20–44)
LYMPHOCYTES NFR BLD MANUAL: 5 % (ref 16–48)
MAGNESIUM SERPL-MCNC: 2 MG/DL (ref 1.8–2.4)
MCHC RBC AUTO-ENTMCNC: 34 G/DL (ref 31–36)
MCV RBC AUTO: 96 FL (ref 80–96)
MONOCYTES NFR BLD AUTO: 0.1 K/UL (ref 0.1–1.3)
MONOCYTES NFR BLD AUTO: 1.1 % (ref 2–12)
MONOCYTES NFR BLD MANUAL: 3 % (ref 0–11)
NEUTROPHILS # BLD AUTO: 10.1 K/UL (ref 1.8–8.9)
NEUTROPHILS NFR BLD AUTO: 93.3 % (ref 43–81)
NEUTS SEG NFR BLD MANUAL: 92 % (ref 42–76)
PHOSPHATE SERPL-MCNC: 2.3 MG/DL (ref 2.5–4.9)
PLATELET # BLD AUTO: 95 K/UL (ref 150–450)
POTASSIUM SERPL-SCNC: 2.8 MMOL/L (ref 3.5–5.1)
PROT SERPL-MCNC: 6.1 G/DL (ref 6.4–8.2)
RBC # BLD AUTO: 5.24 MIL/UL (ref 4.5–6)
SODIUM SERPL-SCNC: 134 MMOL/L (ref 136–145)
WBC NRBC COR # BLD AUTO: 10.8 K/UL (ref 4.3–11)

## 2023-04-14 RX ADMIN — POTASSIUM CHLORIDE SCH MEQ: 1500 TABLET, EXTENDED RELEASE ORAL at 12:12

## 2023-04-14 RX ADMIN — HYDRALAZINE HYDROCHLORIDE PRN MG: 20 INJECTION INTRAMUSCULAR; INTRAVENOUS at 08:38

## 2023-04-14 RX ADMIN — SODIUM CHLORIDE PRN MLS/HR: 9 INJECTION, SOLUTION INTRAVENOUS at 10:10

## 2023-04-14 RX ADMIN — POTASSIUM CHLORIDE SCH MEQ: 1500 TABLET, EXTENDED RELEASE ORAL at 11:01

## 2023-04-14 RX ADMIN — POTASSIUM CHLORIDE SCH MEQ: 1500 TABLET, EXTENDED RELEASE ORAL at 13:27

## 2023-04-14 RX ADMIN — PANTOPRAZOLE SODIUM SCH MG: 40 TABLET, DELAYED RELEASE ORAL at 07:38

## 2023-04-14 RX ADMIN — Medication SCH UNIT: at 08:38

## 2023-04-14 RX ADMIN — POTASSIUM CHLORIDE SCH MEQ: 1500 TABLET, EXTENDED RELEASE ORAL at 10:01

## 2023-04-14 RX ADMIN — OXYCODONE HYDROCHLORIDE AND ACETAMINOPHEN SCH MG: 500 TABLET ORAL at 08:37

## 2023-04-14 RX ADMIN — Medication SCH TAB: at 16:35

## 2023-04-14 RX ADMIN — POTASSIUM CHLORIDE SCH MEQ: 1500 TABLET, EXTENDED RELEASE ORAL at 08:46

## 2023-04-14 RX ADMIN — Medication SCH TAB: at 08:37

## 2023-04-14 NOTE — NUR
TELE RN CLOSING NOTES:



PT IN BED ASLEEP, EASILY AROUND WITH STIMULI. A/O X2 ABLE TO MAKE NEEDS KNOWN,NO SOB OR 
CARDIAC DISTRESS NOTED. ON ROOM AIR AND DANY WELL. CARDIAC MONITOR ATTACHED WITH CURRENT 
READING : ST @100BPM. NOTED WITH IV ACCESS ON LEFT WRIST GAUGE 20 PATENT INTACT AND INFUSING 
MUCOMYST 22, 200MG D5W @55.55ML/HR  STAND BY NS 1L @ 100ML/HR.  SAFETY MEASURES MAINTAINED: 
BED LOCKED AND IN LOWEST POSITION, SIDE RAILS UP X 2. CALL LIGHT IN EASY REACH. ENDORSED TO 
NIGHT SHIFT RN FOR CONTINUITY OF CARE.

## 2023-04-14 NOTE — NUR
TELE RN OPENING NOTES:



RECEIVED PT IN BED, AWAKE. ALERT AND ORIENTED X 2 WITH EPISODES  OF CONFUSION AND 
FORGETFULNESS. NO SOB OR CARDIAC DISTRESS NOTED, ON ROOM AIR AND TOLERATING WELL. ON CARDIA 
MONITOR WITH CURRENT READING OF: SR WITH 1ST AVB @ 100 BPM. IV ACCESS OM LEFT WRIST RUNNING 
MUCOMYST 22,200MG IN D5W @ 55ML/HR. SAFETY MEASURES MAINTAINED, BED LOCKED AND IN LOWEST 
POSITION, SIDE RAILS UP X 2 CALL LIGHT IN EAY REACH FOR HELP. WILL MONITOR PT ACCORDINGLY.

## 2023-04-14 NOTE — NUR
TELE RN CLOSING NOTE



PATIENT IN BED, ASLEEP BUT EASY TO AROUSE AND RESPONSIVE. ABLE TO MAKE NEEDS KNOWN. AFEBRILE 
AND NOT IN ANY FORM OF ACUTE DISTRESS. BREATHING EVEN AND NON LABORED. DENIES THOUGHTS OF 
HURTING HIMSELF. ON TELE MONITORING WITH CURRENT READING OF SR  WITH 1ST DEGREE  WITH 
1ST DEGREE AV BLOCK. WITH IV ACCESS ON L WRIST 20G RUNNING WITH ACETADONE IN D5W REGULATED 
AT 55.55ML/HR. MEDICATED AS ORDERED. CONSTANT VISUAL CHECK DONE TO ENSURE SAFETY. KEPT BED 
IN LOCKED AND IN LOW POSITION. SIDE RAILS UP X2. ADVISED TO USE THE CALL LIGHT WHEN IN NEED 
OF ASSISTANCE. ALL NURSING NEEDS ATTENDED. ENDORSED TO INCOMING SHIFT FOR CONTINUITY OF 
CARE.

## 2023-04-14 NOTE — NUR
TELE RN OPENING NOTE



RECEIVED PATIENT SITTING IN BED, AWAKE, ALERT AND ORIENTED X2. ABLE TO MAKE NEEDS KNOWN. 
AFEBRILE AND NOT IN ANY FORM OF ACUTE DISTRESS. BREATHING EVEN AND NON LABORED. ON TELE 
MONITORING WITH CURRENT READING OF SR WITH 1ST DEGREE AVB 98. WITH IV ACCESS ON L WRIST 20G 
RUNNING WITH ACETADONE IN D5W REGULATED AT 55.55ML/HR. DENIES SI. SAFETY MEASURES IN PLACE. 
KEPT BED IN LOCKED AND IN LOW POSITION. SIDE RAILS UP X2. ADVISED TO USE THE CALL LIGHT WHEN 
IN NEED OF ASSISTANCE.

## 2023-04-14 NOTE — NUR
RN NOTES



RECEIVED A CALL FROM ANDREW PHARMACIST/ POISON TEAM- [phone], CHECKED PT FOR UPDATES IN 
PT'S STATUS AND LAB RESULTS.

## 2023-04-15 VITALS — DIASTOLIC BLOOD PRESSURE: 74 MMHG | SYSTOLIC BLOOD PRESSURE: 148 MMHG

## 2023-04-15 VITALS — SYSTOLIC BLOOD PRESSURE: 141 MMHG | DIASTOLIC BLOOD PRESSURE: 81 MMHG

## 2023-04-15 VITALS — SYSTOLIC BLOOD PRESSURE: 159 MMHG | DIASTOLIC BLOOD PRESSURE: 89 MMHG

## 2023-04-15 VITALS — DIASTOLIC BLOOD PRESSURE: 73 MMHG | SYSTOLIC BLOOD PRESSURE: 45 MMHG

## 2023-04-15 VITALS — SYSTOLIC BLOOD PRESSURE: 139 MMHG | DIASTOLIC BLOOD PRESSURE: 72 MMHG

## 2023-04-15 LAB
ALBUMIN SERPL BCP-MCNC: 2.8 G/DL (ref 3.4–5)
ALP SERPL-CCNC: 70 U/L (ref 46–116)
ALT SERPL W P-5'-P-CCNC: 3862 U/L (ref 12–78)
AST SERPL W P-5'-P-CCNC: 417 U/L (ref 15–37)
BASOPHILS # BLD AUTO: 0 K/UL (ref 0–0.2)
BASOPHILS NFR BLD AUTO: 0.2 % (ref 0–2)
BILIRUB DIRECT SERPL-MCNC: 1.1 MG/DL (ref 0–0.2)
BILIRUB SERPL-MCNC: 2.6 MG/DL (ref 0.2–1)
BUN SERPL-MCNC: 32 MG/DL (ref 7–18)
CALCIUM SERPL-MCNC: 8.6 MG/DL (ref 8.5–10.1)
CHLORIDE SERPL-SCNC: 103 MMOL/L (ref 98–107)
CO2 SERPL-SCNC: 18 MMOL/L (ref 21–32)
CREAT SERPL-MCNC: 1.4 MG/DL (ref 0.6–1.3)
EOSINOPHIL NFR BLD AUTO: 0.2 % (ref 0–6)
GLUCOSE SERPL-MCNC: 118 MG/DL (ref 74–106)
HCT VFR BLD AUTO: 45 % (ref 39–51)
HGB BLD-MCNC: 15.2 G/DL (ref 13.5–17.5)
LYMPHOCYTES NFR BLD AUTO: 0.7 K/UL (ref 0.8–4.8)
LYMPHOCYTES NFR BLD AUTO: 10.6 % (ref 20–44)
MAGNESIUM SERPL-MCNC: 2 MG/DL (ref 1.8–2.4)
MCHC RBC AUTO-ENTMCNC: 34 G/DL (ref 31–36)
MCV RBC AUTO: 95 FL (ref 80–96)
MONOCYTES NFR BLD AUTO: 0.4 K/UL (ref 0.1–1.3)
MONOCYTES NFR BLD AUTO: 6.1 % (ref 2–12)
NEUTROPHILS # BLD AUTO: 5.4 K/UL (ref 1.8–8.9)
NEUTROPHILS NFR BLD AUTO: 82.9 % (ref 43–81)
PHOSPHATE SERPL-MCNC: 2.5 MG/DL (ref 2.5–4.9)
PLATELET # BLD AUTO: 111 K/UL (ref 150–450)
POTASSIUM SERPL-SCNC: 3.6 MMOL/L (ref 3.5–5.1)
PROT SERPL-MCNC: 6 G/DL (ref 6.4–8.2)
RBC # BLD AUTO: 4.71 MIL/UL (ref 4.5–6)
SODIUM SERPL-SCNC: 137 MMOL/L (ref 136–145)
WBC NRBC COR # BLD AUTO: 6.5 K/UL (ref 4.3–11)

## 2023-04-15 RX ADMIN — SODIUM CHLORIDE PRN MLS/HR: 9 INJECTION, SOLUTION INTRAVENOUS at 20:26

## 2023-04-15 RX ADMIN — Medication SCH TAB: at 08:17

## 2023-04-15 RX ADMIN — Medication SCH UNIT: at 08:17

## 2023-04-15 RX ADMIN — PANTOPRAZOLE SODIUM SCH MG: 40 TABLET, DELAYED RELEASE ORAL at 08:16

## 2023-04-15 RX ADMIN — OXYCODONE HYDROCHLORIDE AND ACETAMINOPHEN SCH MG: 500 TABLET ORAL at 08:17

## 2023-04-15 RX ADMIN — Medication SCH TAB: at 16:12

## 2023-04-15 RX ADMIN — HYDRALAZINE HYDROCHLORIDE PRN MG: 20 INJECTION INTRAMUSCULAR; INTRAVENOUS at 04:22

## 2023-04-15 NOTE — NUR
RN OPENING NOTE



RECEIVED PATIENT IN BED, AWAKE, A/O X2, VERBALLY RESPONSIVE AND ABLE TO MAKE NEDS KNOWN. NO 
SIGNS OF ACUTE DISTRESS NOTED. STABLE ON ROOM AIR, NO SOB NOTED, BREATHING EVEN AND 
UNLABORED. NO C/O PAIN OR DISCOMFORT AT THIS TIME. NOTED WITH IV ACCESS ON LEFT WRIST, 
INTACT AND PATENT WITH ACETADONE RUNNING @55.55 ML/HR. ON CARDIAC MONITOR SHOWING SINUS 
RHYTHM, HR @96. SAFETY MEASURE IN PLACE. BED IN LOW AND ;LOCKED POSITION, BED ALARM ON, SIDE 
RAILS UP X2, CALL LIGHT PLACED WITHIN REACH. WILL CONTINUE TO MONITOR PATIENT.

## 2023-04-15 NOTE — NUR
TELE RN CLOSING NOTE



PATIENT IN BED, ASLEEP BUT EASY TO AROUSE AND RESPONSIVE. ABLE TO MAKE NEEDS KNOWN. AFEBRILE 
AND NOT IN ANY FORM OF ACUTE DISTRESS. BREATHING EVEN AND NON LABORED. ON TELE MONITORING 
WITH CURRENT READING OF SR 95 WITH  PAC WITH 1ST DEGREE AVB. WITH IV ACCESS ON L WRIST 20G 
RUNNING WITH ACETADONE IN D5W REGULATED AT 55.55ML/HR. MEDICATED AS ORDERED. DENIES THOUGHTS 
OF HURTING SELF. SAFETY MEASURES IN PLACE. KEPT BED IN LOCKED AND IN LOW POSITION. SIDE 
RAILS UP X2. ADVISED TO USE THE CALL LIGHT WHEN IN NEED OF ASSISTANCE. ALL NURSING NEEDS 
ATTENDED. ENDORSED TO INCOMING SHIFT FOR CONTINUITY OF CARE.

## 2023-04-15 NOTE — NUR
TELE RN NOTE



PATIENT WAS NOTED WITH ELEVATED /89, P-91. PRN HYDRALAZINE WAS ADMINISTERED PER 
PARAMETER.

## 2023-04-15 NOTE — NUR
TELE RN OPENING NOTE



RECEIVED PATIENT AWAKE IN BED, A/O X1-2, VERBALLY RESPONSIVE AND ABLE TO MAKE SIMPLE NEEDS 
KNOWN, FEELING TIRED. NO SIGNS OF ACUTE DISTRESS NOTED. ON RA WITH NO SOB NOTED, BREATHING 
EVEN AND UNLABORED. NO C/O PAIN OR DISCOMFORT AT THIS TIME. WITH IV ACCESS ON LEFT WRIST, 
INTACT AND PATENT, RUNNING NS @100 ML/HR. ON CARDIAC MONITOR SHOWING SINUS RHYTHM, HR @99. 
SAFETY MEASURE IN PLACE: BED IN LOW AND LOCKED POSITION, BED ALARM ON, SIDE RAILS UP X3, 
CALL LIGHT PLACED WITHIN REACH. WILL CONTINUE TO MONITOR AND ASSIST.

## 2023-04-15 NOTE — NUR
RN CLOSING NOTE



PATIENT IN BED, AWAKE, A/O X2, VERBALLY RESPONSIVE AND ABLE TO MAKE SIMPLE NEEDS KNOWN. NO 
SIGNS OF ACUTE DISTRESS NOTED. REMAINS STABLE ON ROOM AIR, NO SOB NOTED, BREATHING EVEN AND 
UNLABORED. NO C/O PAIN OR DISCOMFORT AT THIS TIME. WITH IV ACCESS ON LEFT WRIST, INTACT AND 
PATENT, RUNNING NS @100 ML/HR. CONTINUE ON CARDIAC MONITOR SHOWING SINUS RHYTHM, HR @98. 
SAFETY MEASURE MAINTAINED. BED IN LOW AND LOCKED POSITION, BED ALARM ON, SIDE RAILS UP X2, 
CALL LIGHT PLACED WITHIN REACH. WILL ENDORSE TO NEXT SHIFT FOR CHRISTINE.

## 2023-04-16 VITALS — SYSTOLIC BLOOD PRESSURE: 141 MMHG | DIASTOLIC BLOOD PRESSURE: 80 MMHG

## 2023-04-16 VITALS — DIASTOLIC BLOOD PRESSURE: 78 MMHG | SYSTOLIC BLOOD PRESSURE: 142 MMHG

## 2023-04-16 VITALS — DIASTOLIC BLOOD PRESSURE: 70 MMHG | SYSTOLIC BLOOD PRESSURE: 145 MMHG

## 2023-04-16 VITALS — SYSTOLIC BLOOD PRESSURE: 140 MMHG | DIASTOLIC BLOOD PRESSURE: 69 MMHG

## 2023-04-16 VITALS — SYSTOLIC BLOOD PRESSURE: 152 MMHG | DIASTOLIC BLOOD PRESSURE: 80 MMHG

## 2023-04-16 LAB
ALBUMIN SERPL BCP-MCNC: 2.9 G/DL (ref 3.4–5)
ALP SERPL-CCNC: 72 U/L (ref 46–116)
AST SERPL W P-5'-P-CCNC: 152 U/L (ref 15–37)
BASOPHILS # BLD AUTO: 0 K/UL (ref 0–0.2)
BASOPHILS NFR BLD AUTO: 0.4 % (ref 0–2)
BILIRUB DIRECT SERPL-MCNC: 1.1 MG/DL (ref 0–0.2)
BILIRUB SERPL-MCNC: 2.1 MG/DL (ref 0.2–1)
BUN SERPL-MCNC: 29 MG/DL (ref 7–18)
CALCIUM SERPL-MCNC: 8.3 MG/DL (ref 8.5–10.1)
CHLORIDE SERPL-SCNC: 107 MMOL/L (ref 98–107)
CO2 SERPL-SCNC: 23 MMOL/L (ref 21–32)
CREAT SERPL-MCNC: 1.4 MG/DL (ref 0.6–1.3)
EOSINOPHIL NFR BLD AUTO: 1.1 % (ref 0–6)
GLUCOSE SERPL-MCNC: 99 MG/DL (ref 74–106)
HCT VFR BLD AUTO: 41 % (ref 39–51)
HGB BLD-MCNC: 13.4 G/DL (ref 13.5–17.5)
LYMPHOCYTES NFR BLD AUTO: 1 K/UL (ref 0.8–4.8)
LYMPHOCYTES NFR BLD AUTO: 17.8 % (ref 20–44)
MAGNESIUM SERPL-MCNC: 1.5 MG/DL (ref 1.8–2.4)
MCHC RBC AUTO-ENTMCNC: 33 G/DL (ref 31–36)
MCV RBC AUTO: 99 FL (ref 80–96)
MONOCYTES NFR BLD AUTO: 0.4 K/UL (ref 0.1–1.3)
MONOCYTES NFR BLD AUTO: 7.8 % (ref 2–12)
NEUTROPHILS # BLD AUTO: 4.1 K/UL (ref 1.8–8.9)
NEUTROPHILS NFR BLD AUTO: 72.9 % (ref 43–81)
PHOSPHATE SERPL-MCNC: 2.6 MG/DL (ref 2.5–4.9)
PLATELET # BLD AUTO: 114 K/UL (ref 150–450)
POTASSIUM SERPL-SCNC: 2.8 MMOL/L (ref 3.5–5.1)
PROT SERPL-MCNC: 5.4 G/DL (ref 6.4–8.2)
RBC # BLD AUTO: 4.09 MIL/UL (ref 4.5–6)
SODIUM SERPL-SCNC: 139 MMOL/L (ref 136–145)
WBC NRBC COR # BLD AUTO: 5.6 K/UL (ref 4.3–11)

## 2023-04-16 RX ADMIN — Medication SCH UNIT: at 08:38

## 2023-04-16 RX ADMIN — Medication SCH TAB: at 16:58

## 2023-04-16 RX ADMIN — Medication SCH TAB: at 08:35

## 2023-04-16 RX ADMIN — SODIUM CHLORIDE PRN MLS/HR: 9 INJECTION, SOLUTION INTRAVENOUS at 07:30

## 2023-04-16 RX ADMIN — OXYCODONE HYDROCHLORIDE AND ACETAMINOPHEN SCH MG: 500 TABLET ORAL at 08:36

## 2023-04-16 RX ADMIN — PANTOPRAZOLE SODIUM SCH MG: 40 TABLET, DELAYED RELEASE ORAL at 07:43

## 2023-04-16 NOTE — NUR
RN NOTES



INFORMED DR. LIN THAT HIS POTASSIUM IS LOW 2.8. DOCTOR RILEY TOLD ME TO GIVE 80 MEQ PO 
OF POTASSIUM.

## 2023-04-16 NOTE — NUR
TELE RN OPENING NOTE



RECEIVED PATIENT AWAKE IN BED, A/O X1-2, VERBALLY RESPONSIVE AND ABLE TO MAKE SIMPLE NEEDS 
KNOWN, FEELING TIRED. NO SIGNS OF ACUTE DISTRESS NOTED. ON RA WITH NO SOB NOTED, BREATHING 
EVEN AND UNLABORED. NO C/O PAIN OR DISCOMFORT AT THIS TIME. WITH IV ACCESS ON LEFT WRIST, 
INTACT AND PATENT, RUNNING NS @100 ML/HR. ON CARDIAC MONITOR SHOWING SINUS RHYTHM, HR @97. 
SAFETY MEASURE IN PLACE: BED IN LOW AND LOCKED POSITION, BED ALARM ON, SIDE RAILS UP X3, 
CALL LIGHT PLACED WITHIN REACH. WILL CONTINUE TO MONITOR PATIENT

## 2023-04-16 NOTE — NUR
DISCHARGE NOTES 



PATIENT DISCHARGE HOME IN STABLE CONDITION. A/O X4. ABLE TO MAKE NEEDS KNOWN. AMBULATORY 
WITH STEADY GAIT. PATIENT IS BREATHING EVENLY AND UNLABORED ON ROOM AIR, NO SIGNS OF 
DISTRESS NOTED. PT WITH REDNESS ON LEFT AND RIGHT ELBOW, PHOTO TAKEN AND RECORDED. DISCHARGE 
INSTRUCTIONS GIVEN VERBALLY AND IN WRITTEN FORM TO PATIENT AND SON BINTA AND PATIENT WIFE 
GARIMA , PT AND PT SON, AND WIFE VERBALIZED UNDERSTANDING. PATIENT ALL BELONGINGS ACCOUNTED 
FOR, BELONGING SHEET SIGNED BY PT. IV ACCESS ON LEFT WRIST G#20 REMOVED, DRY DRESSING 
APPLIED AT SITE, NO SIGNS OF BLEEDING NOTED. NAME ARMBAND REMOVED. PATIENT LEFT UNIT AT 1700 
AMBULATORY ACCOMPANIED BY LELE GRAMAJO. CHARGE NURSE AWARE OF D/C.

## 2023-04-16 NOTE — NUR
TELE RN CLOSING NOTE



PATIENT SLEEPING IN BED, EASILY AROUSABLE. A/O X1-2, VERBALLY RESPONSIVE AND ABLE TO MAKE 
SIMPLE NEEDS KNOWN. NO SIGNS OF ACUTE DISTRESS NOTED. STABLE ON RA WITH NO SOB NOTED, 
BREATHING EVEN AND UNLABORED. NO C/O PAIN OR DISCOMFORT AT THIS TIME. WITH IV ACCESS ON LEFT 
WRIST, INTACT AND PATENT, RUNNING NS @100 ML/HR. ON CARDIAC MONITOR SHOWING SINUS RHYTHM 
WITH 1ST DEG AV BLOCK AND PAC'S, HR IN THE 90'S DURING SHIFT. ALL CARE PROVIDED AND MEDS 
TOLERATED WELL. SAFETY MEASURES MAINTAINED: BED IN LOW AND LOCKED POSITION, BED ALARM ON, 
SIDE RAILS UP X3, CALL LIGHT PLACED WITHIN REACH. WILL ENDORSE CHRISTINE TO DAY SHIFT NURSE.